# Patient Record
Sex: MALE | Race: WHITE | NOT HISPANIC OR LATINO | Employment: OTHER | URBAN - METROPOLITAN AREA
[De-identification: names, ages, dates, MRNs, and addresses within clinical notes are randomized per-mention and may not be internally consistent; named-entity substitution may affect disease eponyms.]

---

## 2017-01-20 ENCOUNTER — CLINIC PROCEDURE ONLY (OUTPATIENT)
Dept: URBAN - METROPOLITAN AREA CLINIC 26 | Facility: CLINIC | Age: 82
End: 2017-01-20

## 2017-01-20 DIAGNOSIS — H35.3211: ICD-10-CM

## 2017-01-20 PROCEDURE — 67028 INJECTION EYE DRUG: CPT

## 2017-01-20 ASSESSMENT — VISUAL ACUITY
OS_SC: 20/400
OD_SC: 20/50+1

## 2017-01-20 ASSESSMENT — TONOMETRY
OS_IOP_MMHG: 13
OD_IOP_MMHG: 12

## 2017-03-03 ENCOUNTER — FOLLOW UP AND POST INJECTION EVALUATION (OUTPATIENT)
Dept: URBAN - METROPOLITAN AREA CLINIC 26 | Facility: CLINIC | Age: 82
End: 2017-03-03

## 2017-03-03 VITALS
BODY MASS INDEX: 24.16 KG/M2 | SYSTOLIC BLOOD PRESSURE: 104 MMHG | HEART RATE: 72 BPM | HEIGHT: 65 IN | DIASTOLIC BLOOD PRESSURE: 66 MMHG | WEIGHT: 145 LBS

## 2017-03-03 DIAGNOSIS — H40.1130: ICD-10-CM

## 2017-03-03 DIAGNOSIS — H35.61: ICD-10-CM

## 2017-03-03 DIAGNOSIS — H35.3211: ICD-10-CM

## 2017-03-03 DIAGNOSIS — H35.3124: ICD-10-CM

## 2017-03-03 DIAGNOSIS — H43.393: ICD-10-CM

## 2017-03-03 PROCEDURE — 4004F PT TOBACCO SCREEN RCVD TLK: CPT

## 2017-03-03 PROCEDURE — 92014 COMPRE OPH EXAM EST PT 1/>: CPT

## 2017-03-03 PROCEDURE — 67028 INJECTION EYE DRUG: CPT

## 2017-03-03 PROCEDURE — 2027F OPTIC NERVE HEAD EVAL DONE: CPT

## 2017-03-03 PROCEDURE — 92250 FUNDUS PHOTOGRAPHY W/I&R: CPT

## 2017-03-03 PROCEDURE — 2019F DILATED MACUL EXAM DONE: CPT

## 2017-03-03 PROCEDURE — G8427 DOCREV CUR MEDS BY ELIG CLIN: HCPCS

## 2017-03-03 PROCEDURE — 4177F TALK PT/CRGVR RE AREDS PREV: CPT

## 2017-03-03 ASSESSMENT — TONOMETRY
OS_IOP_MMHG: 18
OD_IOP_MMHG: 17

## 2017-03-03 ASSESSMENT — VISUAL ACUITY
OD_CC: 20/60+1
OS_CC: 20/200

## 2017-04-07 ENCOUNTER — CLINIC PROCEDURE ONLY (OUTPATIENT)
Dept: URBAN - METROPOLITAN AREA CLINIC 26 | Facility: CLINIC | Age: 82
End: 2017-04-07

## 2017-04-07 DIAGNOSIS — H35.3124: ICD-10-CM

## 2017-04-07 DIAGNOSIS — H35.3211: ICD-10-CM

## 2017-04-07 PROCEDURE — 92134 CPTRZ OPH DX IMG PST SGM RTA: CPT

## 2017-04-07 PROCEDURE — 67028 INJECTION EYE DRUG: CPT

## 2017-04-07 ASSESSMENT — VISUAL ACUITY
OD_CC: 20/40+1
OS_CC: 20/400

## 2017-04-07 ASSESSMENT — TONOMETRY
OD_IOP_MMHG: 14
OS_IOP_MMHG: 12

## 2017-05-11 ENCOUNTER — FOLLOW UP AND POST INJECTION EVALUATION (OUTPATIENT)
Dept: URBAN - METROPOLITAN AREA CLINIC 26 | Facility: CLINIC | Age: 82
End: 2017-05-11

## 2017-05-11 DIAGNOSIS — H40.1130: ICD-10-CM

## 2017-05-11 DIAGNOSIS — H04.123: ICD-10-CM

## 2017-05-11 DIAGNOSIS — H35.3211: ICD-10-CM

## 2017-05-11 DIAGNOSIS — H43.393: ICD-10-CM

## 2017-05-11 DIAGNOSIS — H35.61: ICD-10-CM

## 2017-05-11 DIAGNOSIS — H35.3124: ICD-10-CM

## 2017-05-11 PROCEDURE — 92250 FUNDUS PHOTOGRAPHY W/I&R: CPT

## 2017-05-11 PROCEDURE — 4177F TALK PT/CRGVR RE AREDS PREV: CPT

## 2017-05-11 PROCEDURE — 92014 COMPRE OPH EXAM EST PT 1/>: CPT

## 2017-05-11 PROCEDURE — 4004F PT TOBACCO SCREEN RCVD TLK: CPT

## 2017-05-11 PROCEDURE — 2019F DILATED MACUL EXAM DONE: CPT

## 2017-05-11 PROCEDURE — 67028 INJECTION EYE DRUG: CPT

## 2017-05-11 PROCEDURE — 92134 CPTRZ OPH DX IMG PST SGM RTA: CPT

## 2017-05-11 PROCEDURE — 2027F OPTIC NERVE HEAD EVAL DONE: CPT

## 2017-05-11 PROCEDURE — G8427 DOCREV CUR MEDS BY ELIG CLIN: HCPCS

## 2017-05-11 ASSESSMENT — VISUAL ACUITY
OS_CC: 20/400
OD_CC: 20/60+2

## 2017-05-11 ASSESSMENT — TONOMETRY
OD_IOP_MMHG: 13
OS_IOP_MMHG: 11

## 2017-06-23 ENCOUNTER — PROCEDURE ONLY (OUTPATIENT)
Dept: URBAN - METROPOLITAN AREA CLINIC 26 | Facility: CLINIC | Age: 82
End: 2017-06-23

## 2017-06-23 DIAGNOSIS — H35.3211: ICD-10-CM

## 2017-06-23 DIAGNOSIS — H35.3124: ICD-10-CM

## 2017-06-23 PROCEDURE — 67028 INJECTION EYE DRUG: CPT

## 2017-06-23 PROCEDURE — 92134 CPTRZ OPH DX IMG PST SGM RTA: CPT

## 2017-06-23 ASSESSMENT — TONOMETRY
OS_IOP_MMHG: 12
OD_IOP_MMHG: 11

## 2017-06-23 ASSESSMENT — VISUAL ACUITY
OS_CC: 20/400
OD_CC: 20/50+1

## 2017-08-15 ENCOUNTER — CLINICAL PROCEDURE AND DIAGNOSTIC TESTING ONLY (OUTPATIENT)
Dept: URBAN - METROPOLITAN AREA CLINIC 26 | Facility: CLINIC | Age: 82
End: 2017-08-15

## 2017-08-15 DIAGNOSIS — H35.3211: ICD-10-CM

## 2017-08-15 DIAGNOSIS — H35.3124: ICD-10-CM

## 2017-08-15 PROCEDURE — 67028 INJECTION EYE DRUG: CPT

## 2017-08-15 PROCEDURE — 92134 CPTRZ OPH DX IMG PST SGM RTA: CPT

## 2017-08-15 ASSESSMENT — TONOMETRY
OS_IOP_MMHG: 11
OD_IOP_MMHG: 18

## 2017-08-15 ASSESSMENT — VISUAL ACUITY
OD_CC: 20/40
OS_CC: 20/400

## 2017-10-05 ENCOUNTER — FOLLOW UP AND POST INJECTION EVALUATION (OUTPATIENT)
Dept: URBAN - METROPOLITAN AREA CLINIC 26 | Facility: CLINIC | Age: 82
End: 2017-10-05

## 2017-10-05 VITALS — SYSTOLIC BLOOD PRESSURE: 118 MMHG | DIASTOLIC BLOOD PRESSURE: 68 MMHG | HEART RATE: 80 BPM | HEIGHT: 60 IN

## 2017-10-05 DIAGNOSIS — H35.3211: ICD-10-CM

## 2017-10-05 DIAGNOSIS — H40.1130: ICD-10-CM

## 2017-10-05 DIAGNOSIS — H35.3124: ICD-10-CM

## 2017-10-05 DIAGNOSIS — H35.61: ICD-10-CM

## 2017-10-05 DIAGNOSIS — H43.393: ICD-10-CM

## 2017-10-05 PROCEDURE — 2027F OPTIC NERVE HEAD EVAL DONE: CPT

## 2017-10-05 PROCEDURE — 4177F TALK PT/CRGVR RE AREDS PREV: CPT

## 2017-10-05 PROCEDURE — 92014 COMPRE OPH EXAM EST PT 1/>: CPT

## 2017-10-05 PROCEDURE — 2019F DILATED MACUL EXAM DONE: CPT

## 2017-10-05 PROCEDURE — 92250 FUNDUS PHOTOGRAPHY W/I&R: CPT

## 2017-10-05 PROCEDURE — 67028 INJECTION EYE DRUG: CPT

## 2017-10-05 PROCEDURE — 92134 CPTRZ OPH DX IMG PST SGM RTA: CPT

## 2017-10-05 PROCEDURE — G8427 DOCREV CUR MEDS BY ELIG CLIN: HCPCS

## 2017-10-05 PROCEDURE — 4004F PT TOBACCO SCREEN RCVD TLK: CPT

## 2017-10-05 ASSESSMENT — VISUAL ACUITY
OS_CC: CF 4FT
OD_PH: 20/30-2
OS_PH: 20/200-1
OD_CC: 20/40+1

## 2017-10-05 ASSESSMENT — TONOMETRY
OS_IOP_MMHG: 13
OD_IOP_MMHG: 12

## 2017-11-07 ENCOUNTER — CLINICAL PROCEDURE AND DIAGNOSTIC TESTING ONLY (OUTPATIENT)
Dept: URBAN - METROPOLITAN AREA CLINIC 26 | Facility: CLINIC | Age: 82
End: 2017-11-07

## 2017-11-07 DIAGNOSIS — H35.3124: ICD-10-CM

## 2017-11-07 DIAGNOSIS — H35.3211: ICD-10-CM

## 2017-11-07 PROCEDURE — 67028 INJECTION EYE DRUG: CPT

## 2017-11-07 PROCEDURE — 92134 CPTRZ OPH DX IMG PST SGM RTA: CPT

## 2017-11-07 ASSESSMENT — VISUAL ACUITY
OS_CC: 20/100
OD_CC: 20/40+2

## 2017-11-07 ASSESSMENT — TONOMETRY
OD_IOP_MMHG: 12
OS_IOP_MMHG: 14

## 2018-01-04 ENCOUNTER — FOLLOW UP AND POST INJECTION EVALUATION (OUTPATIENT)
Dept: URBAN - METROPOLITAN AREA CLINIC 26 | Facility: CLINIC | Age: 83
End: 2018-01-04

## 2018-01-04 VITALS
HEIGHT: 66 IN | HEART RATE: 75 BPM | SYSTOLIC BLOOD PRESSURE: 129 MMHG | BODY MASS INDEX: 24.27 KG/M2 | WEIGHT: 151 LBS | DIASTOLIC BLOOD PRESSURE: 74 MMHG

## 2018-01-04 DIAGNOSIS — H04.123: ICD-10-CM

## 2018-01-04 DIAGNOSIS — H40.1130: ICD-10-CM

## 2018-01-04 DIAGNOSIS — H43.393: ICD-10-CM

## 2018-01-04 DIAGNOSIS — H35.3124: ICD-10-CM

## 2018-01-04 DIAGNOSIS — H35.3211: ICD-10-CM

## 2018-01-04 DIAGNOSIS — H35.61: ICD-10-CM

## 2018-01-04 PROCEDURE — 67028 INJECTION EYE DRUG: CPT

## 2018-01-04 PROCEDURE — 92134 CPTRZ OPH DX IMG PST SGM RTA: CPT

## 2018-01-04 PROCEDURE — 92014 COMPRE OPH EXAM EST PT 1/>: CPT

## 2018-01-04 ASSESSMENT — TONOMETRY
OS_IOP_MMHG: 17
OD_IOP_MMHG: 17

## 2018-01-04 ASSESSMENT — VISUAL ACUITY
OS_CC: 20/200
OD_CC: 20/30-

## 2018-02-08 ENCOUNTER — FOLLOW UP AND POST INJECTION EVALUATION (OUTPATIENT)
Dept: URBAN - METROPOLITAN AREA CLINIC 26 | Facility: CLINIC | Age: 83
End: 2018-02-08

## 2018-02-08 VITALS — DIASTOLIC BLOOD PRESSURE: 68 MMHG | HEART RATE: 72 BPM | SYSTOLIC BLOOD PRESSURE: 122 MMHG | HEIGHT: 60 IN

## 2018-02-08 DIAGNOSIS — H35.61: ICD-10-CM

## 2018-02-08 DIAGNOSIS — H35.3211: ICD-10-CM

## 2018-02-08 DIAGNOSIS — H43.393: ICD-10-CM

## 2018-02-08 DIAGNOSIS — H40.1130: ICD-10-CM

## 2018-02-08 DIAGNOSIS — H35.3124: ICD-10-CM

## 2018-02-08 PROCEDURE — 67028 INJECTION EYE DRUG: CPT

## 2018-02-08 PROCEDURE — 92134 CPTRZ OPH DX IMG PST SGM RTA: CPT

## 2018-02-08 PROCEDURE — 92014 COMPRE OPH EXAM EST PT 1/>: CPT

## 2018-02-08 ASSESSMENT — VISUAL ACUITY
OS_CC: 20/80-2
OD_CC: 20/30-2

## 2018-02-08 ASSESSMENT — TONOMETRY
OS_IOP_MMHG: 10
OD_IOP_MMHG: 10

## 2018-03-15 ENCOUNTER — FOLLOW UP AND POST INJECTION EVALUATION (OUTPATIENT)
Dept: URBAN - METROPOLITAN AREA CLINIC 26 | Facility: CLINIC | Age: 83
End: 2018-03-15

## 2018-03-15 DIAGNOSIS — H43.393: ICD-10-CM

## 2018-03-15 DIAGNOSIS — H40.1130: ICD-10-CM

## 2018-03-15 DIAGNOSIS — H35.3124: ICD-10-CM

## 2018-03-15 DIAGNOSIS — H35.3211: ICD-10-CM

## 2018-03-15 DIAGNOSIS — H35.61: ICD-10-CM

## 2018-03-15 PROCEDURE — 92250 FUNDUS PHOTOGRAPHY W/I&R: CPT

## 2018-03-15 PROCEDURE — 67028 INJECTION EYE DRUG: CPT

## 2018-03-15 PROCEDURE — 92134 CPTRZ OPH DX IMG PST SGM RTA: CPT

## 2018-03-15 PROCEDURE — 92012 INTRM OPH EXAM EST PATIENT: CPT

## 2018-03-15 ASSESSMENT — VISUAL ACUITY
OD_CC: 20/40-2
OS_CC: 20/80+1

## 2018-03-15 ASSESSMENT — TONOMETRY
OS_IOP_MMHG: 13
OD_IOP_MMHG: 12

## 2018-04-20 ENCOUNTER — CLINICAL PROCEDURE AND DIAGNOSTIC TESTING ONLY (OUTPATIENT)
Dept: URBAN - METROPOLITAN AREA CLINIC 26 | Facility: CLINIC | Age: 83
End: 2018-04-20

## 2018-04-20 DIAGNOSIS — H35.3211: ICD-10-CM

## 2018-04-20 DIAGNOSIS — H35.3124: ICD-10-CM

## 2018-04-20 PROCEDURE — 92134 CPTRZ OPH DX IMG PST SGM RTA: CPT

## 2018-04-20 PROCEDURE — 67028 INJECTION EYE DRUG: CPT

## 2018-04-20 ASSESSMENT — VISUAL ACUITY
OD_CC: 20/30-2
OS_CC: 20/80+1

## 2018-04-20 ASSESSMENT — TONOMETRY
OD_IOP_MMHG: 11
OS_IOP_MMHG: 11

## 2018-06-01 ENCOUNTER — FOLLOW UP AND POST INJECTION EVALUATION (OUTPATIENT)
Dept: URBAN - METROPOLITAN AREA CLINIC 26 | Facility: CLINIC | Age: 83
End: 2018-06-01

## 2018-06-01 VITALS — DIASTOLIC BLOOD PRESSURE: 80 MMHG | HEIGHT: 60 IN | HEART RATE: 69 BPM | SYSTOLIC BLOOD PRESSURE: 134 MMHG

## 2018-06-01 DIAGNOSIS — H35.61: ICD-10-CM

## 2018-06-01 DIAGNOSIS — H43.393: ICD-10-CM

## 2018-06-01 DIAGNOSIS — H40.1130: ICD-10-CM

## 2018-06-01 DIAGNOSIS — H35.3211: ICD-10-CM

## 2018-06-01 DIAGNOSIS — H35.3124: ICD-10-CM

## 2018-06-01 PROCEDURE — 92014 COMPRE OPH EXAM EST PT 1/>: CPT

## 2018-06-01 PROCEDURE — 92134 CPTRZ OPH DX IMG PST SGM RTA: CPT

## 2018-06-01 PROCEDURE — 92250 FUNDUS PHOTOGRAPHY W/I&R: CPT

## 2018-06-01 PROCEDURE — 67028 INJECTION EYE DRUG: CPT

## 2018-06-01 ASSESSMENT — TONOMETRY
OS_IOP_MMHG: 11
OD_IOP_MMHG: 11

## 2018-06-01 ASSESSMENT — VISUAL ACUITY
OS_PH: 20/100-2
OD_CC: 20/40-2
OS_CC: 20/200+1

## 2018-10-05 ENCOUNTER — FOLLOW UP AND POST INJECTION EVALUATION (OUTPATIENT)
Dept: URBAN - METROPOLITAN AREA CLINIC 26 | Facility: CLINIC | Age: 83
End: 2018-10-05

## 2018-10-05 DIAGNOSIS — H43.393: ICD-10-CM

## 2018-10-05 DIAGNOSIS — H35.3124: ICD-10-CM

## 2018-10-05 DIAGNOSIS — H40.1130: ICD-10-CM

## 2018-10-05 DIAGNOSIS — H35.61: ICD-10-CM

## 2018-10-05 DIAGNOSIS — H35.3211: ICD-10-CM

## 2018-10-05 PROCEDURE — 92134 CPTRZ OPH DX IMG PST SGM RTA: CPT

## 2018-10-05 PROCEDURE — 92012 INTRM OPH EXAM EST PATIENT: CPT

## 2018-10-05 PROCEDURE — 67028 INJECTION EYE DRUG: CPT

## 2018-10-05 PROCEDURE — 92250 FUNDUS PHOTOGRAPHY W/I&R: CPT

## 2018-10-05 ASSESSMENT — VISUAL ACUITY
OS_PH: 20/100-1
OS_CC: 20/200+1
OD_CC: 20/40

## 2018-10-05 ASSESSMENT — TONOMETRY
OD_IOP_MMHG: 14
OS_IOP_MMHG: 15

## 2018-11-16 ENCOUNTER — FOLLOW UP AND POST INJECTION EVALUATION (OUTPATIENT)
Dept: URBAN - METROPOLITAN AREA CLINIC 26 | Facility: CLINIC | Age: 83
End: 2018-11-16

## 2018-11-16 DIAGNOSIS — H35.61: ICD-10-CM

## 2018-11-16 DIAGNOSIS — H43.393: ICD-10-CM

## 2018-11-16 DIAGNOSIS — H40.1130: ICD-10-CM

## 2018-11-16 DIAGNOSIS — H35.3211: ICD-10-CM

## 2018-11-16 DIAGNOSIS — H35.3124: ICD-10-CM

## 2018-11-16 PROCEDURE — 92134 CPTRZ OPH DX IMG PST SGM RTA: CPT

## 2018-11-16 PROCEDURE — 92014 COMPRE OPH EXAM EST PT 1/>: CPT

## 2018-11-16 PROCEDURE — 67028 INJECTION EYE DRUG: CPT

## 2018-11-16 PROCEDURE — 92250 FUNDUS PHOTOGRAPHY W/I&R: CPT

## 2018-11-16 ASSESSMENT — TONOMETRY
OS_IOP_MMHG: 13
OD_IOP_MMHG: 14

## 2018-11-16 ASSESSMENT — VISUAL ACUITY
OS_CC: 20/100-1
OD_CC: 20/40+2

## 2019-01-11 ENCOUNTER — CLINICAL PROCEDURE AND DIAGNOSTIC TESTING ONLY (OUTPATIENT)
Dept: URBAN - METROPOLITAN AREA CLINIC 26 | Facility: CLINIC | Age: 84
End: 2019-01-11

## 2019-01-11 DIAGNOSIS — H35.3211: ICD-10-CM

## 2019-01-11 DIAGNOSIS — H35.3124: ICD-10-CM

## 2019-01-11 PROCEDURE — 67028 INJECTION EYE DRUG: CPT

## 2019-01-11 PROCEDURE — 92134 CPTRZ OPH DX IMG PST SGM RTA: CPT

## 2019-01-11 ASSESSMENT — TONOMETRY
OD_IOP_MMHG: 14
OS_IOP_MMHG: 12

## 2019-01-11 ASSESSMENT — VISUAL ACUITY
OS_CC: 20/200-1
OD_CC: 20/40-2

## 2019-03-01 ENCOUNTER — FOLLOW UP AND POST INJECTION EVALUATION (OUTPATIENT)
Dept: URBAN - METROPOLITAN AREA CLINIC 26 | Facility: CLINIC | Age: 84
End: 2019-03-01

## 2019-03-01 VITALS — HEIGHT: 66 IN | WEIGHT: 140 LBS | BODY MASS INDEX: 22.5 KG/M2

## 2019-03-01 DIAGNOSIS — H43.393: ICD-10-CM

## 2019-03-01 DIAGNOSIS — H35.61: ICD-10-CM

## 2019-03-01 DIAGNOSIS — H40.1130: ICD-10-CM

## 2019-03-01 DIAGNOSIS — H35.3211: ICD-10-CM

## 2019-03-01 DIAGNOSIS — H35.3124: ICD-10-CM

## 2019-03-01 PROCEDURE — 92014 COMPRE OPH EXAM EST PT 1/>: CPT

## 2019-03-01 PROCEDURE — 67028 INJECTION EYE DRUG: CPT

## 2019-03-01 PROCEDURE — 92250 FUNDUS PHOTOGRAPHY W/I&R: CPT

## 2019-03-01 PROCEDURE — 92134 CPTRZ OPH DX IMG PST SGM RTA: CPT

## 2019-03-01 ASSESSMENT — VISUAL ACUITY
OD_CC: 20/50+2
OS_CC: 20/200-2

## 2019-03-01 ASSESSMENT — TONOMETRY
OD_IOP_MMHG: 13
OS_IOP_MMHG: 15

## 2019-12-03 ENCOUNTER — FOLLOW UP (OUTPATIENT)
Dept: URBAN - METROPOLITAN AREA CLINIC 26 | Facility: CLINIC | Age: 84
End: 2019-12-03

## 2019-12-03 DIAGNOSIS — H43.393: ICD-10-CM

## 2019-12-03 DIAGNOSIS — H35.3211: ICD-10-CM

## 2019-12-03 DIAGNOSIS — H40.1130: ICD-10-CM

## 2019-12-03 DIAGNOSIS — H35.3124: ICD-10-CM

## 2019-12-03 DIAGNOSIS — H35.61: ICD-10-CM

## 2019-12-03 PROCEDURE — 67028 INJECTION EYE DRUG: CPT

## 2019-12-03 PROCEDURE — 92014 COMPRE OPH EXAM EST PT 1/>: CPT

## 2019-12-03 PROCEDURE — 92250 FUNDUS PHOTOGRAPHY W/I&R: CPT

## 2019-12-03 PROCEDURE — 92134 CPTRZ OPH DX IMG PST SGM RTA: CPT

## 2019-12-03 ASSESSMENT — TONOMETRY
OS_IOP_MMHG: 14
OD_IOP_MMHG: 14

## 2019-12-03 ASSESSMENT — VISUAL ACUITY
OS_CC: 20/100-2
OD_CC: 20/60-2

## 2022-04-28 ENCOUNTER — APPOINTMENT (RX ONLY)
Dept: URBAN - METROPOLITAN AREA CLINIC 333 | Facility: CLINIC | Age: 87
Setting detail: DERMATOLOGY
End: 2022-04-28

## 2022-04-28 DIAGNOSIS — L57.0 ACTINIC KERATOSIS: ICD-10-CM | Status: INADEQUATELY CONTROLLED

## 2022-04-28 DIAGNOSIS — L82.1 OTHER SEBORRHEIC KERATOSIS: ICD-10-CM

## 2022-04-28 DIAGNOSIS — L57.8 OTHER SKIN CHANGES DUE TO CHRONIC EXPOSURE TO NONIONIZING RADIATION: ICD-10-CM

## 2022-04-28 DIAGNOSIS — Z85.828 PERSONAL HISTORY OF OTHER MALIGNANT NEOPLASM OF SKIN: ICD-10-CM

## 2022-04-28 DIAGNOSIS — L81.4 OTHER MELANIN HYPERPIGMENTATION: ICD-10-CM

## 2022-04-28 DIAGNOSIS — D22 MELANOCYTIC NEVI: ICD-10-CM

## 2022-04-28 PROBLEM — D22.5 MELANOCYTIC NEVI OF TRUNK: Status: ACTIVE | Noted: 2022-04-28

## 2022-04-28 PROCEDURE — ? ADDITIONAL NOTES

## 2022-04-28 PROCEDURE — 17000 DESTRUCT PREMALG LESION: CPT

## 2022-04-28 PROCEDURE — 99213 OFFICE O/P EST LOW 20 MIN: CPT | Mod: 25

## 2022-04-28 PROCEDURE — ? FULL BODY SKIN EXAM

## 2022-04-28 PROCEDURE — ? LIQUID NITROGEN

## 2022-04-28 PROCEDURE — 17003 DESTRUCT PREMALG LES 2-14: CPT

## 2022-04-28 PROCEDURE — ? COUNSELING

## 2022-04-28 PROCEDURE — ? TREATMENT REGIMEN

## 2022-04-28 ASSESSMENT — LOCATION SIMPLE DESCRIPTION DERM
LOCATION SIMPLE: LEFT LIP
LOCATION SIMPLE: LEFT CHEEK
LOCATION SIMPLE: RIGHT CHEEK
LOCATION SIMPLE: ANTERIOR SCALP
LOCATION SIMPLE: RIGHT FOOT
LOCATION SIMPLE: UPPER BACK
LOCATION SIMPLE: LEFT FOREHEAD
LOCATION SIMPLE: RIGHT UPPER BACK

## 2022-04-28 ASSESSMENT — LOCATION ZONE DERM
LOCATION ZONE: FACE
LOCATION ZONE: LIP
LOCATION ZONE: SCALP
LOCATION ZONE: TRUNK
LOCATION ZONE: FEET

## 2022-04-28 ASSESSMENT — LOCATION DETAILED DESCRIPTION DERM
LOCATION DETAILED: LEFT MEDIAL FOREHEAD
LOCATION DETAILED: LEFT LOWER CUTANEOUS LIP
LOCATION DETAILED: RIGHT SUPERIOR MEDIAL UPPER BACK
LOCATION DETAILED: LEFT INFERIOR MEDIAL MALAR CHEEK
LOCATION DETAILED: LEFT INFERIOR CENTRAL MALAR CHEEK
LOCATION DETAILED: MID-FRONTAL SCALP
LOCATION DETAILED: INFERIOR THORACIC SPINE
LOCATION DETAILED: RIGHT SUPERIOR LATERAL MALAR CHEEK
LOCATION DETAILED: RIGHT INFERIOR CENTRAL MALAR CHEEK
LOCATION DETAILED: RIGHT LATERAL DORSAL FOOT

## 2022-04-28 NOTE — PROCEDURE: LIQUID NITROGEN
Render Post-Care Instructions In Note?: no
Consent: The patient's verbal consent was obtained including but not limited to risks of crusting, scabbing, blistering, scarring, darker or lighter pigmentary change, recurrence, incomplete removal and infection.
Duration Of Freeze Thaw-Cycle (Seconds): 7
Number Of Freeze-Thaw Cycles: 1 freeze-thaw cycle
Detail Level: Detailed
Show Applicator Variable?: Yes
Post-Care Instructions: I reviewed with the patient in detail post-care instructions. Patient is to wear sunprotection, and avoid picking at any of the treated lesions. Pt may apply Vaseline to crusted or scabbing areas.

## 2022-07-15 ENCOUNTER — APPOINTMENT (RX ONLY)
Dept: URBAN - METROPOLITAN AREA CLINIC 333 | Facility: CLINIC | Age: 87
Setting detail: DERMATOLOGY
End: 2022-07-15

## 2022-07-15 PROCEDURE — ? REASON FOR LEAVING

## 2022-09-13 ENCOUNTER — NEW PATIENT (OUTPATIENT)
Dept: URBAN - METROPOLITAN AREA CLINIC 26 | Facility: CLINIC | Age: 87
End: 2022-09-13

## 2022-09-13 VITALS
HEART RATE: 66 BPM | HEIGHT: 65 IN | WEIGHT: 140 LBS | SYSTOLIC BLOOD PRESSURE: 118 MMHG | BODY MASS INDEX: 23.32 KG/M2 | DIASTOLIC BLOOD PRESSURE: 66 MMHG

## 2022-09-13 DIAGNOSIS — H04.123: ICD-10-CM

## 2022-09-13 DIAGNOSIS — H35.3211: ICD-10-CM

## 2022-09-13 DIAGNOSIS — H35.3134: ICD-10-CM

## 2022-09-13 PROCEDURE — 67220 TREATMENT OF CHOROID LESION: CPT

## 2022-09-13 PROCEDURE — 92014 COMPRE OPH EXAM EST PT 1/>: CPT

## 2022-09-13 PROCEDURE — 92235 FLUORESCEIN ANGRPH MLTIFRAME: CPT

## 2022-09-13 PROCEDURE — 92134 CPTRZ OPH DX IMG PST SGM RTA: CPT

## 2022-09-13 ASSESSMENT — VISUAL ACUITY
OS_CC: 20/200-2
OS_SC: 20/400
OD_CC: 20/200+2
OD_SC: 20/200-2

## 2022-09-13 ASSESSMENT — TONOMETRY
OS_IOP_MMHG: 17
OD_IOP_MMHG: 19